# Patient Record
Sex: FEMALE
[De-identification: names, ages, dates, MRNs, and addresses within clinical notes are randomized per-mention and may not be internally consistent; named-entity substitution may affect disease eponyms.]

---

## 2020-11-21 ENCOUNTER — NURSE TRIAGE (OUTPATIENT)
Dept: OTHER | Facility: CLINIC | Age: 45
End: 2020-11-21

## 2020-11-21 NOTE — TELEPHONE ENCOUNTER
Reason for Disposition   [1] SEVERE pain (e.g., excruciating) AND [2] present > 1 hour    Answer Assessment - Initial Assessment Questions  1. LOCATION: \"Where does it hurt? \"       ABD pain under lt breast  2. Radiation: \"Does the pain shoot anywhere else? \" (e.g., chest, back)      Yes, into back  3. ONSET: \"When did the pain begin? \" (e.g., minutes, hours or days ago)       About 5 hours ago  4. SUDDEN: \"Gradual or sudden onset? \"      sudden  5. PATTERN \"Does the pain come and go, or is it constant? \"     - If constant: \"Is it getting better, staying the same, or worsening? \"       (Note: Constant means the pain never goes away completely; most serious pain is constant and it progresses)      - If intermittent: \"How long does it last?\" \"Do you have pain now? \"      (Note: Intermittent means the pain goes away completely between bouts)      constant  6. SEVERITY: \"How bad is the pain? \"  (e.g., Scale 1-10; mild, moderate, or severe)    - MILD (1-3): doesn't interfere with normal activities, abdomen soft and not tender to touch     - MODERATE (4-7): interferes with normal activities or awakens from sleep, tender to touch     - SEVERE (8-10): excruciating pain, doubled over, unable to do any normal activities       Moderate 7/10  7. RECURRENT SYMPTOM: \"Have you ever had this type of abdominal pain before? \" If so, ask: \"When was the last time? \" and \"What happened that time? \"       no  8. CAUSE: \"What do you think is causing the abdominal pain? \"      unsure  9. RELIEVING/AGGRAVATING FACTORS: \"What makes it better or worse? \" (e.g., movement, antacids, bowel movement)      Zofran, lying down makes worse  10. OTHER SYMPTOMS: \"Has there been any vomiting, diarrhea, constipation, or urine problems? \"        Vomiting, constipation  11. PREGNANCY: \"Is there any chance you are pregnant? \" \"When was your last menstrual period? \"        n/a    Protocols used: ABDOMINAL PAIN - [facility]

## 2020-12-26 ENCOUNTER — NURSE TRIAGE (OUTPATIENT)
Dept: OTHER | Facility: CLINIC | Age: 45
End: 2020-12-26

## 2020-12-26 NOTE — TELEPHONE ENCOUNTER
Adenomyosis had endometrial ablasion done was told she would have little to light periods and she has not had any period since then yesterday she started with abdominal pain and noted when she went to bathroom there was a lot of rich red brown blood no clotting and it was bad enough she had to take a shower and change clothing she is having severe bad pains on left side     Patient called pre-service center ([facility]) MMO with red flag complaint. Brief description of triage: abnormal vaginal bleeding with moderate to severe abdominal pain and discomfort    Triage indicates for patient to go to PCP office within 4 hours however they are closed today so go to nearest urgent care or ED     Care advice provided, patient verbalizes understanding; denies any other questions or concerns; instructed to call back for any new or worsening symptoms. Attention Provider: Thank you for allowing me to participate in the care of your patient. The patient was connected to triage in response to information provided to the ECC. Please do not respond through this encounter as the response is not directed to a shared pool. Reason for Disposition   [1] Constant abdominal pain AND [2] present > 2 hours    Answer Assessment - Initial Assessment Questions  1. AMOUNT: \"Describe the bleeding that you are having. \"     - SPOTTING: spotting, or pinkish / brownish mucous discharge; does not fill panti-liner or pad     - MILD:  less than 1 pad / hour; less than patient's usual menstrual bleeding    - MODERATE: 1-2 pads / hour; 1 menstrual cup every 6 hours; small-medium blood clots (e.g., pea, grape, small coin)    - SEVERE: soaking 2 or more pads/hour for 2 or more hours; 1 menstrual cup every 2 hours; bleeding not contained by pads or continuous red blood from vagina; large blood clots (e.g., golf ball, large coin)       Severe rich red/ brown no clots     2. ONSET: \"When did the bleeding begin? \" \"Is it continuing now? \"      Yesterday was heavy today it was just noted when wiping     3. MENSTRUAL PERIOD: \"When was the last normal menstrual period? \" \"How is this different than your period? \"      About a year ago last February  She typically had hints first with PMS and spotting and bright red blood and had no warning      4. REGULARITY: \"How regular are your periods? \"      Have not had one since February     5. ABDOMINAL PAIN: \"Do you have any pain? \" \"How bad is the pain? \"  (e.g., Scale 1-10; mild, moderate, or severe)    - MILD (1-3): doesn't interfere with normal activities, abdomen soft and not tender to touch     - MODERATE (4-7): interferes with normal activities or awakens from sleep, tender to touch     - SEVERE (8-10): excruciating pain, doubled over, unable to do any normal activities       Moderate    6. PREGNANCY: \"Could you be pregnant? \" Herman Cao you sexually active? \" \"Did you recently give birth? \"      No they removed fallopian tubes     7. BREASTFEEDING: Herman Cao you breastfeeding? \"      Na    8. HORMONES: Herman Cao you taking any hormone medications, prescription or OTC? \" (e.g., birth control pills, estrogen)      No     9. BLOOD THINNERS: \"Do you take any blood thinners? \" (e.g., Coumadin/warfarin, Pradaxa/dabigatran, aspirin)      No    10. CAUSE: \"What do you think is causing the bleeding? \" (e.g., recent gyn surgery, recent gyn procedure; known bleeding disorder, cervical cancer, polycystic ovarian disease, fibroids)          Fibroids, adenomyosis     11. HEMODYNAMIC STATUS: \"Are you weak or feeling lightheaded? \" If so, ask: \"Can you stand and walk normally? \"         Last night a little dizzy and headache     12. OTHER SYMPTOMS: \"What other symptoms are you having with the bleeding? \" (e.g., passed tissue, vaginal discharge, fever, menstrual-type cramps)        Abdominal cramps    Protocols used: VAGINAL BLEEDING - ABNORMAL-ADULT-

## 2024-02-15 PROBLEM — B00.1 RECURRENT COLD SORES: Status: ACTIVE | Noted: 2024-02-15

## 2024-02-15 PROBLEM — M79.7 FIBROMYALGIA: Status: ACTIVE | Noted: 2024-02-15

## 2024-02-15 PROBLEM — R41.840 POOR CONCENTRATION: Status: ACTIVE | Noted: 2024-02-15

## 2024-02-15 PROBLEM — N39.0 RECURRENT UTI: Status: RESOLVED | Noted: 2024-02-15 | Resolved: 2024-02-15

## 2024-02-15 PROBLEM — F51.01 PRIMARY INSOMNIA: Status: ACTIVE | Noted: 2024-02-15

## 2024-02-15 PROBLEM — M15.9 GENERALIZED OSTEOARTHRITIS: Status: ACTIVE | Noted: 2024-02-15

## 2024-02-15 PROBLEM — G47.19 EXCESSIVE DAYTIME SLEEPINESS: Status: ACTIVE | Noted: 2024-02-15

## 2024-02-15 PROBLEM — L90.0 LICHEN SCLEROSUS: Status: ACTIVE | Noted: 2024-02-15

## 2024-02-15 PROBLEM — L98.7 EXCESSIVE SKIN AND SUBCUTANEOUS TISSUE: Status: RESOLVED | Noted: 2024-02-15 | Resolved: 2024-02-15

## 2024-02-15 PROBLEM — M05.79 SEROPOSITIVE RHEUMATOID ARTHRITIS OF MULTIPLE SITES (MULTI): Status: ACTIVE | Noted: 2024-02-15

## 2024-02-15 PROBLEM — Z98.84 BARIATRIC SURGERY STATUS: Status: ACTIVE | Noted: 2024-02-15

## 2024-02-15 PROBLEM — G56.22 ULNAR NEUROPATHY OF LEFT UPPER EXTREMITY: Status: ACTIVE | Noted: 2024-02-15

## 2024-02-15 PROBLEM — F33.1 MODERATE EPISODE OF RECURRENT MAJOR DEPRESSIVE DISORDER (MULTI): Status: ACTIVE | Noted: 2024-02-15

## 2024-02-15 PROBLEM — E61.1 IRON DEFICIENCY: Status: ACTIVE | Noted: 2024-02-15

## 2024-02-15 PROBLEM — G43.909 MIGRAINE HEADACHE: Status: ACTIVE | Noted: 2024-02-15

## 2024-02-15 PROBLEM — I10 BENIGN ESSENTIAL HYPERTENSION: Status: ACTIVE | Noted: 2024-02-15

## 2024-02-15 PROBLEM — R79.89 LOW VITAMIN D LEVEL: Status: ACTIVE | Noted: 2024-02-15

## 2024-02-15 PROBLEM — F41.1 GENERALIZED ANXIETY DISORDER: Status: ACTIVE | Noted: 2024-02-15

## 2024-02-15 RX ORDER — FERROUS SULFATE 325(65) MG
325 TABLET ORAL
COMMUNITY
Start: 2021-03-17 | End: 2024-02-16 | Stop reason: WASHOUT

## 2024-02-15 RX ORDER — DULOXETIN HYDROCHLORIDE 60 MG/1
1 CAPSULE, DELAYED RELEASE ORAL DAILY
COMMUNITY
Start: 2021-07-30 | End: 2024-02-16 | Stop reason: WASHOUT

## 2024-02-15 RX ORDER — ONDANSETRON 4 MG/1
4 TABLET, ORALLY DISINTEGRATING ORAL EVERY 8 HOURS PRN
COMMUNITY
Start: 2021-06-02

## 2024-02-15 RX ORDER — BUPROPION HYDROCHLORIDE 150 MG/1
2 TABLET, EXTENDED RELEASE ORAL
COMMUNITY
Start: 2023-07-13

## 2024-02-15 RX ORDER — GABAPENTIN 300 MG/1
1 CAPSULE ORAL NIGHTLY
COMMUNITY
Start: 2021-07-30 | End: 2024-02-16 | Stop reason: WASHOUT

## 2024-02-15 RX ORDER — OMEPRAZOLE 40 MG/1
40 CAPSULE, DELAYED RELEASE ORAL
COMMUNITY
Start: 2019-02-27 | End: 2024-02-16 | Stop reason: WASHOUT

## 2024-02-15 RX ORDER — BACLOFEN 10 MG/1
TABLET ORAL
COMMUNITY
Start: 2020-08-06 | End: 2024-02-16 | Stop reason: WASHOUT

## 2024-02-15 RX ORDER — LISINOPRIL 10 MG/1
1 TABLET ORAL DAILY
COMMUNITY
Start: 2022-02-07

## 2024-02-15 RX ORDER — MELATONIN 5 MG
CAPSULE ORAL
COMMUNITY
Start: 2020-07-16 | End: 2024-02-16 | Stop reason: WASHOUT

## 2024-02-15 RX ORDER — TRANEXAMIC ACID 650 MG/1
1300 TABLET ORAL 2 TIMES DAILY
COMMUNITY
Start: 2022-01-24

## 2024-02-15 RX ORDER — TOPIRAMATE 25 MG/1
1 TABLET ORAL DAILY
COMMUNITY
Start: 2022-06-29 | End: 2024-02-16 | Stop reason: WASHOUT

## 2024-02-15 RX ORDER — BACLOFEN 20 MG
1 TABLET ORAL DAILY
COMMUNITY
Start: 2022-04-07

## 2024-02-15 RX ORDER — SUMATRIPTAN 50 MG/1
50 TABLET, FILM COATED ORAL ONCE AS NEEDED
COMMUNITY
Start: 2022-06-29

## 2024-02-15 RX ORDER — PROPRANOLOL HYDROCHLORIDE 10 MG/1
10 TABLET ORAL 2 TIMES DAILY
COMMUNITY
Start: 2023-10-13 | End: 2024-04-10

## 2024-02-15 RX ORDER — VALACYCLOVIR HYDROCHLORIDE 1 G/1
1000 TABLET, FILM COATED ORAL 3 TIMES DAILY
COMMUNITY
Start: 2020-07-16

## 2024-02-16 ENCOUNTER — OFFICE VISIT (OUTPATIENT)
Dept: RHEUMATOLOGY | Facility: CLINIC | Age: 49
End: 2024-02-16
Payer: COMMERCIAL

## 2024-02-16 ENCOUNTER — LAB (OUTPATIENT)
Dept: LAB | Facility: LAB | Age: 49
End: 2024-02-16
Payer: COMMERCIAL

## 2024-02-16 VITALS
TEMPERATURE: 97.7 F | HEIGHT: 69 IN | OXYGEN SATURATION: 98 % | SYSTOLIC BLOOD PRESSURE: 135 MMHG | WEIGHT: 263.9 LBS | DIASTOLIC BLOOD PRESSURE: 99 MMHG | HEART RATE: 67 BPM | BODY MASS INDEX: 39.09 KG/M2

## 2024-02-16 DIAGNOSIS — M05.79 SEROPOSITIVE RHEUMATOID ARTHRITIS OF MULTIPLE SITES (MULTI): Primary | ICD-10-CM

## 2024-02-16 DIAGNOSIS — M05.79 SEROPOSITIVE RHEUMATOID ARTHRITIS OF MULTIPLE SITES (MULTI): ICD-10-CM

## 2024-02-16 DIAGNOSIS — M15.9 GENERALIZED OSTEOARTHRITIS: ICD-10-CM

## 2024-02-16 DIAGNOSIS — M48.05 SPINAL STENOSIS OF THORACOLUMBAR REGION: ICD-10-CM

## 2024-02-16 PROBLEM — F90.9 ADHD: Status: ACTIVE | Noted: 2024-02-16

## 2024-02-16 LAB
ALBUMIN SERPL BCP-MCNC: 4.4 G/DL (ref 3.4–5)
ALP SERPL-CCNC: 79 U/L (ref 33–110)
ALT SERPL W P-5'-P-CCNC: 11 U/L (ref 7–45)
ANION GAP SERPL CALC-SCNC: 10 MMOL/L (ref 10–20)
AST SERPL W P-5'-P-CCNC: 15 U/L (ref 9–39)
BASOPHILS # BLD AUTO: 0.04 X10*3/UL (ref 0–0.1)
BASOPHILS NFR BLD AUTO: 0.7 %
BILIRUB SERPL-MCNC: 0.4 MG/DL (ref 0–1.2)
BUN SERPL-MCNC: 14 MG/DL (ref 6–23)
CALCIUM SERPL-MCNC: 9.2 MG/DL (ref 8.6–10.6)
CHLORIDE SERPL-SCNC: 108 MMOL/L (ref 98–107)
CO2 SERPL-SCNC: 26 MMOL/L (ref 21–32)
CREAT SERPL-MCNC: 0.69 MG/DL (ref 0.5–1.05)
CRP SERPL-MCNC: 0.2 MG/DL
EGFRCR SERPLBLD CKD-EPI 2021: >90 ML/MIN/1.73M*2
EOSINOPHIL # BLD AUTO: 0.11 X10*3/UL (ref 0–0.7)
EOSINOPHIL NFR BLD AUTO: 1.9 %
ERYTHROCYTE [DISTWIDTH] IN BLOOD BY AUTOMATED COUNT: 14 % (ref 11.5–14.5)
ERYTHROCYTE [SEDIMENTATION RATE] IN BLOOD BY WESTERGREN METHOD: 18 MM/H (ref 0–20)
GLUCOSE SERPL-MCNC: 72 MG/DL (ref 74–99)
HCT VFR BLD AUTO: 40.1 % (ref 36–46)
HGB BLD-MCNC: 12.6 G/DL (ref 12–16)
IMM GRANULOCYTES # BLD AUTO: 0.02 X10*3/UL (ref 0–0.7)
IMM GRANULOCYTES NFR BLD AUTO: 0.3 % (ref 0–0.9)
LYMPHOCYTES # BLD AUTO: 1 X10*3/UL (ref 1.2–4.8)
LYMPHOCYTES NFR BLD AUTO: 17.3 %
MCH RBC QN AUTO: 27.5 PG (ref 26–34)
MCHC RBC AUTO-ENTMCNC: 31.4 G/DL (ref 32–36)
MCV RBC AUTO: 87 FL (ref 80–100)
MONOCYTES # BLD AUTO: 0.43 X10*3/UL (ref 0.1–1)
MONOCYTES NFR BLD AUTO: 7.4 %
NEUTROPHILS # BLD AUTO: 4.19 X10*3/UL (ref 1.2–7.7)
NEUTROPHILS NFR BLD AUTO: 72.4 %
NRBC BLD-RTO: 0 /100 WBCS (ref 0–0)
PLATELET # BLD AUTO: 208 X10*3/UL (ref 150–450)
POTASSIUM SERPL-SCNC: 4.2 MMOL/L (ref 3.5–5.3)
PROT SERPL-MCNC: 7.1 G/DL (ref 6.4–8.2)
RBC # BLD AUTO: 4.59 X10*6/UL (ref 4–5.2)
SODIUM SERPL-SCNC: 140 MMOL/L (ref 136–145)
WBC # BLD AUTO: 5.8 X10*3/UL (ref 4.4–11.3)

## 2024-02-16 PROCEDURE — 85652 RBC SED RATE AUTOMATED: CPT

## 2024-02-16 PROCEDURE — 3075F SYST BP GE 130 - 139MM HG: CPT | Performed by: INTERNAL MEDICINE

## 2024-02-16 PROCEDURE — 36415 COLL VENOUS BLD VENIPUNCTURE: CPT

## 2024-02-16 PROCEDURE — 86140 C-REACTIVE PROTEIN: CPT

## 2024-02-16 PROCEDURE — 80053 COMPREHEN METABOLIC PANEL: CPT

## 2024-02-16 PROCEDURE — 1036F TOBACCO NON-USER: CPT | Performed by: INTERNAL MEDICINE

## 2024-02-16 PROCEDURE — 85025 COMPLETE CBC W/AUTO DIFF WBC: CPT

## 2024-02-16 PROCEDURE — 99214 OFFICE O/P EST MOD 30 MIN: CPT | Performed by: INTERNAL MEDICINE

## 2024-02-16 PROCEDURE — 3080F DIAST BP >= 90 MM HG: CPT | Performed by: INTERNAL MEDICINE

## 2024-02-16 RX ORDER — TOPIRAMATE 100 MG/1
100 TABLET, FILM COATED ORAL NIGHTLY
COMMUNITY
Start: 2023-12-05

## 2024-02-16 RX ORDER — AMLODIPINE BESYLATE 10 MG/1
10 TABLET ORAL DAILY
COMMUNITY
Start: 2023-12-15

## 2024-02-16 RX ORDER — HYDROXYZINE HYDROCHLORIDE 25 MG/1
25-50 TABLET, FILM COATED ORAL NIGHTLY PRN
COMMUNITY
Start: 2023-12-02

## 2024-02-16 RX ORDER — LISDEXAMFETAMINE DIMESYLATE 30 MG/1
30 CAPSULE ORAL EVERY MORNING
COMMUNITY
Start: 2024-02-01

## 2024-02-16 RX ORDER — METHOCARBAMOL 750 MG/1
750 TABLET, FILM COATED ORAL 4 TIMES DAILY PRN
COMMUNITY
Start: 2024-02-04

## 2024-02-16 ASSESSMENT — PATIENT HEALTH QUESTIONNAIRE - PHQ9
1. LITTLE INTEREST OR PLEASURE IN DOING THINGS: NOT AT ALL
2. FEELING DOWN, DEPRESSED OR HOPELESS: NOT AT ALL
SUM OF ALL RESPONSES TO PHQ9 QUESTIONS 1 & 2: 0

## 2024-02-16 ASSESSMENT — ENCOUNTER SYMPTOMS
COLOR CHANGE: 0
FEVER: 0
BACK PAIN: 1
MYALGIAS: 1
JOINT SWELLING: 0
COUGH: 0
WEAKNESS: 0
NUMBNESS: 0
ARTHRALGIAS: 1
STIFFNESS: 1
FATIGUE: 0
SHORTNESS OF BREATH: 0

## 2024-02-16 NOTE — ASSESSMENT & PLAN NOTE
RA is largely quiescent.   Not needing further medication to control symptoms.   Pt doing well and taking meds as needed.   Labs ordered today to monitor for increased disease activity, end organ manifestations

## 2024-02-16 NOTE — PATIENT INSTRUCTIONS
It was a pleasure to see you today  Please call if your symptoms worsen  Please review your summary for education and reminders.  Follow up at your next appointment.    If you had labs/xrays done today, you will be able to view on [employer].   We will contact you when the results are reviewed for further discussion.  Please note that you may receive your results before I have had a chance to review.  Please know I will be contacting you for discussion  Homegoing instructions for all patient  A healthy lifestyle helps chronic diseases  These are all the goals you should strive to improve your overall health   Blood pressure <130/85   BMI of <30 or waist circumference that is 1/2 of your height   Fasting blood sugar <107 (if you are diabetic, aim for an A1c <6.4%_   LDL cholesterol <130   Avoid smoking   Manage your stress   Get your preventive exams   Get your immunizations

## 2024-02-16 NOTE — PROGRESS NOTES
RHEUMATOLOGY PROGRESS NOTE  Mela Barth 49 y.o. female  Chief Complaint   Patient presents with    Follow-up    Rheumatoid Arthritis       SUBJECTIVE  Arthritis  Presents for follow-up visit. She complains of stiffness. She reports no joint swelling. The symptoms have been stable. Pertinent negatives include no fatigue, fever or rash. (RA has been stable and has not been needing medication.  Back was hurting more and did see spine surgeon---OA in lower thoracic area with impingement.  Was advised not to have surgery.  Pt tolerating pain and takes methocarbamol to help with it.   No worsening swelling)     Patient Active Problem List    Diagnosis Date Noted    ADHD 02/16/2024    Spinal stenosis of thoracolumbar region 02/16/2024    Bariatric surgery status 02/15/2024    Benign essential hypertension 02/15/2024    Excessive daytime sleepiness 02/15/2024    Fibromyalgia 02/15/2024    Generalized anxiety disorder 02/15/2024    Generalized osteoarthritis 02/15/2024    Iron deficiency 02/15/2024    Lichen sclerosus 02/15/2024    Low vitamin D level 02/15/2024    Migraine headache 02/15/2024    Moderate episode of recurrent major depressive disorder (CMS/HCC) 02/15/2024    Poor concentration 02/15/2024    Primary insomnia 02/15/2024    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    Seropositive rheumatoid arthritis of multiple sites (CMS/HCC) 02/15/2024    Ulnar neuropathy of left upper extremity 02/15/2024     Past Medical History:   Diagnosis Date    Asthma     History of asthma    Carpal tunnel syndrome     History of carpal tunnel syndrome    Consumes one serving of caffeine per day     Excessive skin and subcutaneous tissue 02/15/2024    Long term (current) use of non-steroidal anti-inflammatories (nsaid) 04/15/2019    NSAID long-term use    SATNAM (obstructive sleep apnea)     History of obstructive sleep apnea    Radial styloid tenosynovitis (de quervain)     Tenosynovitis, de Quervain     Current Outpatient Medications  "  Medication Instructions    amLODIPine (NORVASC) 10 mg, oral, Daily    buPROPion SR (Wellbutrin SR) 150 mg 12 hr tablet 2 tablets, oral, Daily with breakfast    hydrOXYzine HCL (ATARAX) 25-50 mg, oral, Nightly PRN    lisinopril 10 mg tablet 1 tablet, oral, Daily    loratadine 10 mg capsule 1 capsule, oral, Daily    magnesium oxide 500 mg tablet 1 tablet, oral, Daily    methocarbamol (ROBAXIN) 750 mg, oral, 4 times daily PRN    ondansetron ODT (ZOFRAN-ODT) 4 mg, oral, Every 8 hours PRN    propranolol (INDERAL) 10 mg, oral, 2 times daily    SUMAtriptan (IMITREX) 50 mg, oral, Once as needed    topiramate (TOPAMAX) 100 mg, oral, Nightly    tranexamic acid (LYSTEDA) 1,300 mg, oral, 2 times daily    valACYclovir (VALTREX) 1,000 mg, oral, 3 times daily    Vyvanse 30 mg, oral, Every morning     Allergies   Allergen Reactions    Iodinated Contrast Media Anaphylaxis    Nickel Unknown    Nsaids (Non-Steroidal Anti-Inflammatory Drug) Other     GI Bleeding  Gastric Bypass    Hydrochlorothiazide Rash    Meloxicam Itching and Rash    Meperidine Rash    Sulfa (Sulfonamide Antibiotics) Itching and Rash     Review of Systems   Constitutional:  Negative for fatigue and fever.   Respiratory:  Negative for cough and shortness of breath.    Cardiovascular:  Negative for leg swelling.   Musculoskeletal:  Positive for arthralgias, arthritis, back pain, myalgias and stiffness. Negative for gait problem and joint swelling.   Skin:  Negative for color change and rash.   Neurological:  Negative for weakness and numbness.       PHYSICAL EXAM  BP (!) 135/99   Pulse 67   Temp 36.5 °C (97.7 °F) (Temporal)   Ht 1.74 m (5' 8.5\")   Wt 120 kg (263 lb 14.4 oz)   SpO2 98%   BMI 39.54 kg/m²   Physical Exam  Vitals reviewed.   Constitutional:       General: She is not in acute distress.     Appearance: Normal appearance.   HENT:      Head: Normocephalic and atraumatic.   Eyes:      Conjunctiva/sclera: Conjunctivae normal.   Pulmonary:      Effort: " Pulmonary effort is normal. No respiratory distress.   Musculoskeletal:         General: No swelling, tenderness or deformity.      Cervical back: Normal range of motion.      Right lower leg: No edema.      Left lower leg: No edema.      Comments: No synovitis of examined joints   Skin:     Coloration: Skin is not pale.      Findings: No erythema or rash.   Neurological:      General: No focal deficit present.      Mental Status: She is alert and oriented to person, place, and time. Mental status is at baseline.      Gait: Gait normal.   Psychiatric:         Mood and Affect: Mood normal.         Assessment/plan  Problem List Items Addressed This Visit       Generalized osteoarthritis    Seropositive rheumatoid arthritis of multiple sites (CMS/HCC) - Primary    Overview     +RF but negative CCP  Previous plaquenil, methotrexate           Relevant Orders    CBC and Auto Differential    Comprehensive Metabolic Panel    C-Reactive Protein    Sedimentation Rate     Follow up: ___12__months

## 2024-02-16 NOTE — LETTER
February 16, 2024     Rommel Moctezuma DO  [address]  [facility] [address]    Patient: Mela Barth   YOB: 1975   Date of Visit: 2/16/2024       Dear Dr. Rommel Moctezuma DO:    Thank you for referring Mela Barth to me for evaluation. Below are my notes for this consultation.  If you have questions, please do not hesitate to call me. I look forward to following your patient along with you.       Sincerely,     Rosangela Jewell MD      CC: No Recipients  ______________________________________________________________________________________    RHEUMATOLOGY PROGRESS NOTE  Mela Barth 49 y.o. female  Chief Complaint   Patient presents with   • Follow-up   • Rheumatoid Arthritis       SUBJECTIVE  Arthritis  Presents for follow-up visit. She complains of stiffness. She reports no joint swelling. The symptoms have been stable. Pertinent negatives include no fatigue, fever or rash. (RA has been stable and has not been needing medication.  Back was hurting more and did see spine surgeon---OA in lower thoracic area with impingement.  Was advised not to have surgery.  Pt tolerating pain and takes methocarbamol to help with it.   No worsening swelling)     Patient Active Problem List    Diagnosis Date Noted   • ADHD 02/16/2024   • Spinal stenosis of thoracolumbar region 02/16/2024   • Bariatric surgery status 02/15/2024   • Benign essential hypertension 02/15/2024   • Excessive daytime sleepiness 02/15/2024   • Fibromyalgia 02/15/2024   • Generalized anxiety disorder 02/15/2024   • Generalized osteoarthritis 02/15/2024   • Iron deficiency 02/15/2024   • Lichen sclerosus 02/15/2024   • Low vitamin D level 02/15/2024   • Migraine headache 02/15/2024   • Moderate episode of recurrent major depressive disorder (CMS/HCC) 02/15/2024   • Poor concentration 02/15/2024   • Primary insomnia 02/15/2024   • 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   • Seropositive  rheumatoid arthritis of multiple sites (CMS/[facility]) 02/15/2024   • Ulnar neuropathy of left upper extremity 02/15/2024     Past Medical History:   Diagnosis Date   • Asthma     History of asthma   • Carpal tunnel syndrome     History of carpal tunnel syndrome   • Consumes one serving of caffeine per day    • Excessive skin and subcutaneous tissue 02/15/2024   • Long term (current) use of non-steroidal anti-inflammatories (nsaid) 04/15/2019    NSAID long-term use   • SATNAM (obstructive sleep apnea)     History of obstructive sleep apnea   • Radial styloid tenosynovitis (de quervain)     Tenosynovitis, de Quervain     Current Outpatient Medications   Medication Instructions   • amLODIPine (NORVASC) 10 mg, oral, Daily   • buPROPion SR (Wellbutrin SR) 150 mg 12 hr tablet 2 tablets, oral, Daily with breakfast   • hydrOXYzine HCL (ATARAX) 25-50 mg, oral, Nightly PRN   • lisinopril 10 mg tablet 1 tablet, oral, Daily   • loratadine 10 mg capsule 1 capsule, oral, Daily   • magnesium oxide 500 mg tablet 1 tablet, oral, Daily   • methocarbamol (ROBAXIN) 750 mg, oral, 4 times daily PRN   • ondansetron ODT (ZOFRAN-ODT) 4 mg, oral, Every 8 hours PRN   • propranolol (INDERAL) 10 mg, oral, 2 times daily   • SUMAtriptan (IMITREX) 50 mg, oral, Once as needed   • topiramate (TOPAMAX) 100 mg, oral, Nightly   • tranexamic acid (LYSTEDA) 1,300 mg, oral, 2 times daily   • valACYclovir (VALTREX) 1,000 mg, oral, 3 times daily   • Vyvanse 30 mg, oral, Every morning     Allergies   Allergen Reactions   • Iodinated Contrast Media Anaphylaxis   • Nickel Unknown   • Nsaids (Non-Steroidal Anti-Inflammatory Drug) Other     GI Bleeding  Gastric Bypass   • Hydrochlorothiazide Rash   • Meloxicam Itching and Rash   • Meperidine Rash   • Sulfa (Sulfonamide Antibiotics) Itching and Rash     Review of Systems   Constitutional:  Negative for fatigue and fever.   Respiratory:  Negative for cough and shortness of breath.    Cardiovascular:  Negative for leg  "swelling.   Musculoskeletal:  Positive for arthralgias, arthritis, back pain, myalgias and stiffness. Negative for gait problem and joint swelling.   Skin:  Negative for color change and rash.   Neurological:  Negative for weakness and numbness.       PHYSICAL EXAM  BP (!) 135/99   Pulse 67   Temp 36.5 °C (97.7 °F) (Temporal)   Ht 1.74 m (5' 8.5\")   Wt 120 kg (263 lb 14.4 oz)   SpO2 98%   BMI 39.54 kg/m²   Physical Exam  Vitals reviewed.   Constitutional:       General: She is not in acute distress.     Appearance: Normal appearance.   HENT:      Head: Normocephalic and atraumatic.   Eyes:      Conjunctiva/sclera: Conjunctivae normal.   Pulmonary:      Effort: Pulmonary effort is normal. No respiratory distress.   Musculoskeletal:         General: No swelling, tenderness or deformity.      Cervical back: Normal range of motion.      Right lower leg: No edema.      Left lower leg: No edema.      Comments: No synovitis of examined joints   Skin:     Coloration: Skin is not pale.      Findings: No erythema or rash.   Neurological:      General: No focal deficit present.      Mental Status: She is alert and oriented to person, place, and time. Mental status is at baseline.      Gait: Gait normal.   Psychiatric:         Mood and Affect: Mood normal.         Assessment/plan  Problem List Items Addressed This Visit       Generalized osteoarthritis    Seropositive rheumatoid arthritis of multiple sites (CMS/HCC) - Primary    Overview     +RF but negative CCP  Previous plaquenil, methotrexate           Relevant Orders    CBC and Auto Differential    Comprehensive Metabolic Panel    C-Reactive Protein    Sedimentation Rate     Follow up: ___12__months        "

## 2024-10-28 ENCOUNTER — APPOINTMENT (OUTPATIENT)
Dept: RHEUMATOLOGY | Facility: CLINIC | Age: 49
End: 2024-10-28
Payer: COMMERCIAL

## 2024-10-28 VITALS
BODY MASS INDEX: 39.86 KG/M2 | TEMPERATURE: 97.5 F | OXYGEN SATURATION: 98 % | SYSTOLIC BLOOD PRESSURE: 180 MMHG | DIASTOLIC BLOOD PRESSURE: 125 MMHG | WEIGHT: 263 LBS | HEART RATE: 77 BPM | HEIGHT: 68 IN

## 2024-10-28 DIAGNOSIS — I10 BENIGN ESSENTIAL HYPERTENSION: ICD-10-CM

## 2024-10-28 DIAGNOSIS — M79.7 FIBROMYALGIA: ICD-10-CM

## 2024-10-28 DIAGNOSIS — M05.79 SEROPOSITIVE RHEUMATOID ARTHRITIS OF MULTIPLE SITES (MULTI): Primary | ICD-10-CM

## 2024-10-28 PROCEDURE — 99214 OFFICE O/P EST MOD 30 MIN: CPT | Performed by: INTERNAL MEDICINE

## 2024-10-28 PROCEDURE — 1036F TOBACCO NON-USER: CPT | Performed by: INTERNAL MEDICINE

## 2024-10-28 PROCEDURE — 3008F BODY MASS INDEX DOCD: CPT | Performed by: INTERNAL MEDICINE

## 2024-10-28 PROCEDURE — 3077F SYST BP >= 140 MM HG: CPT | Performed by: INTERNAL MEDICINE

## 2024-10-28 PROCEDURE — 3080F DIAST BP >= 90 MM HG: CPT | Performed by: INTERNAL MEDICINE

## 2024-10-28 PROCEDURE — 85652 RBC SED RATE AUTOMATED: CPT

## 2024-10-28 PROCEDURE — 80053 COMPREHEN METABOLIC PANEL: CPT

## 2024-10-28 PROCEDURE — 85025 COMPLETE CBC W/AUTO DIFF WBC: CPT

## 2024-10-28 PROCEDURE — 86140 C-REACTIVE PROTEIN: CPT

## 2024-10-28 RX ORDER — LISDEXAMFETAMINE DIMESYLATE 40 MG/1
1 CAPSULE ORAL
COMMUNITY
Start: 2024-09-24

## 2024-10-28 RX ORDER — TRIAMCINOLONE ACETONIDE 1 MG/G
CREAM TOPICAL 2 TIMES DAILY
COMMUNITY
Start: 2024-08-24

## 2024-10-28 RX ORDER — PREGABALIN 50 MG/1
1 CAPSULE ORAL
COMMUNITY
Start: 2024-06-25

## 2024-10-28 RX ORDER — FAMOTIDINE 20 MG/1
1 TABLET, FILM COATED ORAL
COMMUNITY
Start: 2024-08-24 | End: 2024-10-28 | Stop reason: ALTCHOICE

## 2024-10-28 RX ORDER — CETIRIZINE HYDROCHLORIDE 10 MG/1
1 TABLET ORAL
COMMUNITY
Start: 2024-08-24 | End: 2024-10-28 | Stop reason: ALTCHOICE

## 2024-10-28 ASSESSMENT — ENCOUNTER SYMPTOMS
FEVER: 0
FATIGUE: 0
WEAKNESS: 0
ARTHRALGIAS: 1
COUGH: 0
NUMBNESS: 0
BACK PAIN: 1
COLOR CHANGE: 0
SHORTNESS OF BREATH: 0
MYALGIAS: 0
JOINT SWELLING: 0

## 2024-10-29 LAB
ALBUMIN SERPL BCP-MCNC: 4.5 G/DL (ref 3.4–5)
ALP SERPL-CCNC: 89 U/L (ref 33–110)
ALT SERPL W P-5'-P-CCNC: 13 U/L (ref 7–45)
ANION GAP SERPL CALC-SCNC: 14 MMOL/L (ref 10–20)
AST SERPL W P-5'-P-CCNC: 20 U/L (ref 9–39)
BASOPHILS # BLD AUTO: 0.04 X10*3/UL (ref 0–0.1)
BASOPHILS NFR BLD AUTO: 0.7 %
BILIRUB SERPL-MCNC: 0.4 MG/DL (ref 0–1.2)
BUN SERPL-MCNC: 19 MG/DL (ref 6–23)
CALCIUM SERPL-MCNC: 9 MG/DL (ref 8.6–10.6)
CHLORIDE SERPL-SCNC: 104 MMOL/L (ref 98–107)
CO2 SERPL-SCNC: 24 MMOL/L (ref 21–32)
CREAT SERPL-MCNC: 0.86 MG/DL (ref 0.5–1.05)
CRP SERPL-MCNC: 0.47 MG/DL
EGFRCR SERPLBLD CKD-EPI 2021: 84 ML/MIN/1.73M*2
EOSINOPHIL # BLD AUTO: 0.16 X10*3/UL (ref 0–0.7)
EOSINOPHIL NFR BLD AUTO: 2.7 %
ERYTHROCYTE [DISTWIDTH] IN BLOOD BY AUTOMATED COUNT: 14.6 % (ref 11.5–14.5)
ERYTHROCYTE [SEDIMENTATION RATE] IN BLOOD BY WESTERGREN METHOD: 25 MM/H (ref 0–20)
GLUCOSE SERPL-MCNC: 114 MG/DL (ref 74–99)
HCT VFR BLD AUTO: 41.4 % (ref 36–46)
HGB BLD-MCNC: 12.3 G/DL (ref 12–16)
IMM GRANULOCYTES # BLD AUTO: 0.01 X10*3/UL (ref 0–0.7)
IMM GRANULOCYTES NFR BLD AUTO: 0.2 % (ref 0–0.9)
LYMPHOCYTES # BLD AUTO: 1.66 X10*3/UL (ref 1.2–4.8)
LYMPHOCYTES NFR BLD AUTO: 27.9 %
MCH RBC QN AUTO: 25.3 PG (ref 26–34)
MCHC RBC AUTO-ENTMCNC: 29.7 G/DL (ref 32–36)
MCV RBC AUTO: 85 FL (ref 80–100)
MONOCYTES # BLD AUTO: 0.4 X10*3/UL (ref 0.1–1)
MONOCYTES NFR BLD AUTO: 6.7 %
NEUTROPHILS # BLD AUTO: 3.68 X10*3/UL (ref 1.2–7.7)
NEUTROPHILS NFR BLD AUTO: 61.8 %
NRBC BLD-RTO: 0 /100 WBCS (ref 0–0)
PLATELET # BLD AUTO: 247 X10*3/UL (ref 150–450)
POTASSIUM SERPL-SCNC: 4.2 MMOL/L (ref 3.5–5.3)
PROT SERPL-MCNC: 7.4 G/DL (ref 6.4–8.2)
RBC # BLD AUTO: 4.87 X10*6/UL (ref 4–5.2)
SODIUM SERPL-SCNC: 138 MMOL/L (ref 136–145)
WBC # BLD AUTO: 6 X10*3/UL (ref 4.4–11.3)

## 2025-02-17 ENCOUNTER — APPOINTMENT (OUTPATIENT)
Dept: RHEUMATOLOGY | Facility: CLINIC | Age: 50
End: 2025-02-17
Payer: COMMERCIAL